# Patient Record
Sex: MALE | Race: WHITE | NOT HISPANIC OR LATINO | URBAN - METROPOLITAN AREA
[De-identification: names, ages, dates, MRNs, and addresses within clinical notes are randomized per-mention and may not be internally consistent; named-entity substitution may affect disease eponyms.]

---

## 2020-07-25 ENCOUNTER — TELEPHONE ENCOUNTER (OUTPATIENT)
Dept: URBAN - METROPOLITAN AREA CLINIC 13 | Facility: CLINIC | Age: 35
End: 2020-07-25

## 2020-07-26 ENCOUNTER — TELEPHONE ENCOUNTER (OUTPATIENT)
Dept: URBAN - METROPOLITAN AREA CLINIC 13 | Facility: CLINIC | Age: 35
End: 2020-07-26

## 2020-09-11 ENCOUNTER — DASHBOARD ENCOUNTERS (OUTPATIENT)
Age: 35
End: 2020-09-11

## 2020-09-11 ENCOUNTER — OFFICE VISIT (OUTPATIENT)
Dept: URBAN - METROPOLITAN AREA CLINIC 113 | Facility: CLINIC | Age: 35
End: 2020-09-11
Payer: SELF-PAY

## 2020-09-11 VITALS
BODY MASS INDEX: 34.07 KG/M2 | HEART RATE: 89 BPM | SYSTOLIC BLOOD PRESSURE: 113 MMHG | WEIGHT: 230 LBS | TEMPERATURE: 97.8 F | HEIGHT: 69 IN | DIASTOLIC BLOOD PRESSURE: 77 MMHG

## 2020-09-11 DIAGNOSIS — K57.30 DIVERTICULOSIS OF COLON: ICD-10-CM

## 2020-09-11 DIAGNOSIS — B18.2 CHRONIC HEPATITIS C: ICD-10-CM

## 2020-09-11 PROBLEM — 733657002 DIVERTICULOSIS OF COLON: Status: ACTIVE | Noted: 2020-09-11

## 2020-09-11 PROBLEM — 128302006 CHRONIC HEPATITIS C: Status: ACTIVE | Noted: 2020-09-11

## 2020-09-11 PROCEDURE — G8427 DOCREV CUR MEDS BY ELIG CLIN: HCPCS | Performed by: NURSE PRACTITIONER

## 2020-09-11 PROCEDURE — 1036F TOBACCO NON-USER: CPT | Performed by: NURSE PRACTITIONER

## 2020-09-11 PROCEDURE — G9903 PT SCRN TBCO ID AS NON USER: HCPCS | Performed by: NURSE PRACTITIONER

## 2020-09-11 PROCEDURE — 99213 OFFICE O/P EST LOW 20 MIN: CPT | Performed by: NURSE PRACTITIONER

## 2020-09-11 RX ORDER — ACETAMINOPHEN 325 MG/1
1 TABLET AS NEEDED TABLET, FILM COATED ORAL
Status: ACTIVE | COMMUNITY

## 2020-09-11 NOTE — HPI-TODAY'S VISIT:
Mr. Almanzar is a 35-year-old male with a history of hemophilia A, HCV, alcohol abuse, diverticulitis (2 years ago), and obesity, presenting for follow-up after hospitalization for abdominal pain. He was admitted to [facility] on 8/27/2020 for complaints of progressively worsening abdominal pain associated with fever, nausea, vomiting, and right shoulder pain.  CT of the abdomen and pelvis demonstrated sigmoid diverticulitis with microperforation.  He was diagnosed with sepsis on presentation secondary to this.  He was treated with IV antibiotics, IV fluids, antiemetics, analgesics, and kept n.p.o.  General surgery was consulted, and recommended conservative management.  He continued to improve with conservative treatment.  He was discharged on Augmentin twice daily for 14 days of antibiotics total, and was recommended to follow-up with surgery outpatient. Abdominal ultrasound 8/27/2020 : Negative study CT of the abdomen and pelvis with IV contrast 8/27/2020 : Free air, consistent with perforated hollow viscus.  Sigmoid diverticulitis.  Faintly calcified liver mass, unchanged. He has two more days of Augmentin to complete a 14 day course. There is no abdominal pain other than mild positional tenderness, fever or chills. No nausea or vomiting. He has bowel movements 1 to 2 times per day; fairly normal form without blood per rectum. He reports that he is expecting to follow up with surgery, and expects to have a colonoscopy with Dr. August in about 6 weeks. He also is to meet with hematology (Dr. Marie) to have his hemophilia worked up.

## 2020-12-11 ENCOUNTER — OFFICE VISIT (OUTPATIENT)
Dept: URBAN - METROPOLITAN AREA CLINIC 113 | Facility: CLINIC | Age: 35
End: 2020-12-11

## 2021-01-12 ENCOUNTER — OFFICE VISIT (OUTPATIENT)
Dept: URBAN - METROPOLITAN AREA CLINIC 113 | Facility: CLINIC | Age: 36
End: 2021-01-12

## 2021-01-12 NOTE — HPI-OTHER HISTORIES
35-year-old male with a history of hemophilia a, HCV, alcohol abuse, diverticulitis (2 years ago), and obesity, presenting for 3 month follow-up.  He was last seen in the office on 9/11/2020 following hospitalization for acute diverticulitis with microperforation, treated conservatively with IV and oral antibiotics.  He was recommended to proceed with colonoscopy in about 4 weeks.  He has for a referral to Dr. King for surgical evaluation.  Additionally, he was planned for HCV genotype and PCR to assess need for hepatitis C treatment.

## 2024-06-20 ENCOUNTER — OFFICE VISIT (OUTPATIENT)
Dept: URBAN - METROPOLITAN AREA CLINIC 113 | Facility: CLINIC | Age: 39
End: 2024-06-20
Payer: COMMERCIAL

## 2024-06-20 VITALS
HEART RATE: 76 BPM | WEIGHT: 254 LBS | SYSTOLIC BLOOD PRESSURE: 128 MMHG | TEMPERATURE: 98.1 F | DIASTOLIC BLOOD PRESSURE: 79 MMHG | RESPIRATION RATE: 18 BRPM | HEIGHT: 69 IN | BODY MASS INDEX: 37.62 KG/M2

## 2024-06-20 DIAGNOSIS — B17.10 HEPATITIS C: ICD-10-CM

## 2024-06-20 DIAGNOSIS — K57.92 DIVERTICULITIS: ICD-10-CM

## 2024-06-20 PROCEDURE — 99204 OFFICE O/P NEW MOD 45 MIN: CPT | Performed by: NURSE PRACTITIONER

## 2024-06-20 PROCEDURE — 99244 OFF/OP CNSLTJ NEW/EST MOD 40: CPT | Performed by: NURSE PRACTITIONER

## 2024-06-20 RX ORDER — ACETAMINOPHEN 325 MG/1
1 TABLET AS NEEDED TABLET, FILM COATED ORAL
Status: ACTIVE | COMMUNITY

## 2024-06-20 NOTE — HPI-OTHER HISTORIES
CT a/p with contrast 4/27/24: acute sigmoid colon diverticulitis with several small foci of extraluminal gas adjacent to the proximal sigmoid colon c/w microperforation. CT a/p with contrast 2/7/24: small bowel obstruction with dilated loops of small bowel measuring up to 3.5cm with transition point in the right lower quadrant. Acute sigmoid diverticulitis. Interval decrease in size in hypoattenuating lesion in the inferior right hepatic lobe measuring 1.8cm which is indeterminate but oculd represent evolving hemangioma. CT of the abdomen and pelvis August 2020 demonstrated sigmoid diverticulitis with microperforation.  Abdominal ultrasound 8/27/2020 : Negative study Previous MRI abdomen w/wo contrast 1/18/18: two T2 hyperintense right hepatic lesions c/w hemangiomas, measuring 29mm and 18mm.

## 2024-06-20 NOTE — HPI-TODAY'S VISIT:
Mr. Almanzar is a 39-year-old male with a history of hemophilia A, HCV, alcohol abuse, recurrent diverticulitis, referred back to our office by Dr. Raúl Marie for evaluation of hepatitis C. A copy of this document is being forwarded to the referring provider.  He was last seen in the office in September 2020 for follow-up after hospitalization for acute diverticulitis with microperforation, treated nonoperatively with IV and oral antibiotics. A colonoscopy was recommended, but not scheduled due to insurance barriers. He was referred to colorectal surgery, Dr. King, at his request. He ultimately underwent exploratory laparotomy and small bowel resection in October 2020 d/t small bowel phlegmon in the setting of microforation of the small bowel. He returns today after a long interval to discuss hepatitis C treatment. Per a review of prior records, we attempted to treat his HCV (with Harvoni or Mavyret) in 2018, but he was unable to obtain the medication due to insurance barriers. He would like to restart the process. He has had issues with recurrent diverticulitis for which he follows closely with Dr. King. A sigmoid colon resection has been recommended, pending hemophilia work-up at [place]. He is followed locally by Dr. Marie.

## 2024-06-24 ENCOUNTER — TELEPHONE ENCOUNTER (OUTPATIENT)
Dept: URBAN - METROPOLITAN AREA CLINIC 113 | Facility: CLINIC | Age: 39
End: 2024-06-24

## 2024-07-09 ENCOUNTER — TELEPHONE ENCOUNTER (OUTPATIENT)
Dept: URBAN - METROPOLITAN AREA CLINIC 113 | Facility: CLINIC | Age: 39
End: 2024-07-09

## 2024-07-09 RX ORDER — LEDIPASVIR AND SOFOSBUVIR 90; 400 MG/1; MG/1
1 TABLET TABLET, FILM COATED ORAL ONCE A DAY
Qty: 84 TABLET | Refills: 0 | OUTPATIENT
Start: 2024-07-09 | End: 2024-10-01

## 2024-07-10 ENCOUNTER — TELEPHONE ENCOUNTER (OUTPATIENT)
Dept: URBAN - METROPOLITAN AREA CLINIC 113 | Facility: CLINIC | Age: 39
End: 2024-07-10

## 2024-07-11 ENCOUNTER — TELEPHONE ENCOUNTER (OUTPATIENT)
Dept: URBAN - METROPOLITAN AREA CLINIC 113 | Facility: CLINIC | Age: 39
End: 2024-07-11

## 2024-07-15 ENCOUNTER — TELEPHONE ENCOUNTER (OUTPATIENT)
Dept: URBAN - METROPOLITAN AREA CLINIC 113 | Facility: CLINIC | Age: 39
End: 2024-07-15

## 2024-07-15 RX ORDER — VELPATASVIR AND SOFOSBUVIR 100; 400 MG/1; MG/1
1 TABLET TABLET, FILM COATED ORAL ONCE A DAY
Qty: 84 TABLET | Refills: 0 | OUTPATIENT
Start: 2024-07-16 | End: 2024-10-08

## 2024-07-19 ENCOUNTER — TELEPHONE ENCOUNTER (OUTPATIENT)
Dept: URBAN - METROPOLITAN AREA CLINIC 113 | Facility: CLINIC | Age: 39
End: 2024-07-19

## 2024-08-05 ENCOUNTER — TELEPHONE ENCOUNTER (OUTPATIENT)
Dept: URBAN - METROPOLITAN AREA CLINIC 113 | Facility: CLINIC | Age: 39
End: 2024-08-05

## 2024-09-19 ENCOUNTER — OFFICE VISIT (OUTPATIENT)
Dept: URBAN - METROPOLITAN AREA CLINIC 113 | Facility: CLINIC | Age: 39
End: 2024-09-19
Payer: COMMERCIAL

## 2024-09-19 VITALS
WEIGHT: 262.6 LBS | BODY MASS INDEX: 38.89 KG/M2 | HEIGHT: 69 IN | HEART RATE: 80 BPM | DIASTOLIC BLOOD PRESSURE: 83 MMHG | SYSTOLIC BLOOD PRESSURE: 119 MMHG | RESPIRATION RATE: 18 BRPM | TEMPERATURE: 99.1 F

## 2024-09-19 DIAGNOSIS — R74.8 ELEVATED LIVER ENZYMES: ICD-10-CM

## 2024-09-19 DIAGNOSIS — B18.2 CARRIER OF VIRAL HEPATITIS C: ICD-10-CM

## 2024-09-19 PROCEDURE — 99214 OFFICE O/P EST MOD 30 MIN: CPT | Performed by: NURSE PRACTITIONER

## 2024-09-19 RX ORDER — ACETAMINOPHEN 325 MG/1
1 TABLET AS NEEDED TABLET, FILM COATED ORAL
Status: ACTIVE | COMMUNITY

## 2024-09-19 RX ORDER — VELPATASVIR AND SOFOSBUVIR 100; 400 MG/1; MG/1
1 TABLET TABLET, FILM COATED ORAL ONCE A DAY
Qty: 84 TABLET | Refills: 0 | Status: ACTIVE | COMMUNITY
Start: 2024-07-16 | End: 2024-10-08

## 2024-09-19 RX ORDER — VELPATASVIR AND SOFOSBUVIR 100; 400 MG/1; MG/1
1 TABLET TABLET, FILM COATED ORAL ONCE A DAY
OUTPATIENT
Start: 2024-07-16

## 2024-09-19 RX ORDER — LEDIPASVIR AND SOFOSBUVIR 90; 400 MG/1; MG/1
1 TABLET TABLET, FILM COATED ORAL ONCE A DAY
Qty: 84 TABLET | Refills: 0 | Status: ON HOLD | COMMUNITY
Start: 2024-07-09 | End: 2024-10-01

## 2024-09-19 NOTE — HPI-OTHER HISTORIES
CT a/p with contrast 4/27/24: acute sigmoid colon diverticulitis with several small foci of extraluminal gas adjacent to the proximal sigmoid colon c/w microperforation. CT a/p with contrast 2/7/24: small bowel obstruction with dilated loops of small bowel measuring up to 3.5cm with transition point in the right lower quadrant. Acute sigmoid diverticulitis. Interval decrease in size in hypoattenuating lesion in the inferior right hepatic lobe measuring 1.8cm which is indeterminate but oculd represent evolving hemangioma. CT of the abdomen and pelvis August 2020 demonstrated sigmoid diverticulitis with microperforation.  S/p exploratory laparotomy and small bowel resection in October 2020 by Dr. King d/t small bowel phlegmon in the setting of microforation of the small bowel. Abdominal ultrasound 8/27/2020 : Negative study Previous MRI abdomen w/wo contrast 1/18/18: two T2 hyperintense right hepatic lesions c/w hemangiomas, measuring 29mm and 18mm.

## 2024-09-19 NOTE — HPI-TODAY'S VISIT:
Mr. Almanzar is a 39-year-old male with a history of hemophilia A, HCV, alcohol abuse, recurrent diverticulitis, presenting for follow-up regarding hepatitis C. He was seen in the office in June to reestablish care regarding treatment naive hepatitis C. Labs, to include HCV genotype and viral RNA, were planned in preparation for treatment. Most recent CT showed a normal liver; no evidence for cirrhosis. He has a history of benign hepatic hemangiomas. Labs 7/17/24 Reactive hepatitis B surface antibody, negative hepatitis B surface antigen.  aFP 2.8. 6/20/2024:H/H 15/44, MCV 98.7, , WBC 6.34.  PT/INR 13.6/1.0.  AST 46, ALT 69, ALP 93, T. bili 0.5, CMP otherwise unremarkable.  Positive hepatitis C antibody with detectable viral load. HCV genotype 1a or 1b. He started Epclusa in early August and has done well on treatment.  He is without abdominal complaint.  No abdominal pain nausea or vomiting.  His bowel habits are regular without blood per rectum.

## 2024-09-20 ENCOUNTER — OFFICE VISIT (OUTPATIENT)
Dept: URBAN - METROPOLITAN AREA CLINIC 113 | Facility: CLINIC | Age: 39
End: 2024-09-20

## 2024-10-17 ENCOUNTER — LAB OUTSIDE AN ENCOUNTER (OUTPATIENT)
Dept: URBAN - METROPOLITAN AREA CLINIC 113 | Facility: CLINIC | Age: 39
End: 2024-10-17

## 2024-12-18 ENCOUNTER — TELEPHONE ENCOUNTER (OUTPATIENT)
Dept: URBAN - METROPOLITAN AREA CLINIC 113 | Facility: CLINIC | Age: 39
End: 2024-12-18

## 2025-01-21 ENCOUNTER — OFFICE VISIT (OUTPATIENT)
Dept: URBAN - METROPOLITAN AREA CLINIC 113 | Facility: CLINIC | Age: 40
End: 2025-01-21
Payer: COMMERCIAL

## 2025-01-21 VITALS
WEIGHT: 282 LBS | BODY MASS INDEX: 41.77 KG/M2 | HEIGHT: 69 IN | DIASTOLIC BLOOD PRESSURE: 81 MMHG | SYSTOLIC BLOOD PRESSURE: 119 MMHG | TEMPERATURE: 98.4 F | HEART RATE: 88 BPM | RESPIRATION RATE: 18 BRPM

## 2025-01-21 DIAGNOSIS — R74.8 ELEVATED LIVER ENZYMES: ICD-10-CM

## 2025-01-21 DIAGNOSIS — B17.10 HEPATITIS C: ICD-10-CM

## 2025-01-21 PROCEDURE — 99214 OFFICE O/P EST MOD 30 MIN: CPT | Performed by: NURSE PRACTITIONER

## 2025-01-21 RX ORDER — VELPATASVIR AND SOFOSBUVIR 100; 400 MG/1; MG/1
1 TABLET TABLET, FILM COATED ORAL ONCE A DAY
Status: ON HOLD | COMMUNITY
Start: 2024-07-16

## 2025-01-21 RX ORDER — ACETAMINOPHEN 325 MG/1
1 TABLET AS NEEDED TABLET, FILM COATED ORAL
Status: ACTIVE | COMMUNITY

## 2025-01-21 NOTE — HPI-OTHER HISTORIES
Labs 7/17/24 Reactive hepatitis B surface antibody, negative hepatitis B surface antigen. aFP 2.8. 6/20/2024:H/H 15/44, MCV 98.7, , WBC 6.34. PT/INR 13.6/1.0. AST 46, ALT 69, ALP 93, T. bili 0.5, CMP otherwise unremarkable. Positive hepatitis C antibody with detectable viral load. HCV genotype 1a or 1b. CT a/p with contrast 4/27/24: acute sigmoid colon diverticulitis with several small foci of extraluminal gas adjacent to the proximal sigmoid colon c/w microperforation. CT a/p with contrast 2/7/24: small bowel obstruction with dilated loops of small bowel measuring up to 3.5cm with transition point in the right lower quadrant. Acute sigmoid diverticulitis. Interval decrease in size in hypoattenuating lesion in the inferior right hepatic lobe measuring 1.8cm which is indeterminate but oculd represent evolving hemangioma. CT of the abdomen and pelvis August 2020 demonstrated sigmoid diverticulitis with microperforation.  S/p exploratory laparotomy and small bowel resection in October 2020 by Dr. King d/t small bowel phlegmon in the setting of microforation of the small bowel. Abdominal ultrasound 8/27/2020 : Negative study Previous MRI abdomen w/wo contrast 1/18/18: two T2 hyperintense right hepatic lesions c/w hemangiomas, measuring 29mm and 18mm.

## 2025-01-21 NOTE — HPI-TODAY'S VISIT:
Mr. Almanzar is a 39-year-old male with a history of hemophilia A, HCV, alcohol abuse, recurrent diverticulitis, presenting for follow-up regarding hepatitis C. He was seen in the office in September for follow-up regarding hepatitis C genotype 1a or 1b, currently completing treatment with Epclusa. Labs were planned to trend his LFTs, and orders were provided for end of treatment bloodwork to assess response to treatment. HCV RNA was planned at follow-up to ensure sustained virologic response. Labs Sept 2024: CBC, CMP, PT/INR unremarkable; normal LFTs. He continues to do well from a GI standpoint. No abdominal pain, nausea or vomiting. His bowel habits are regular without blood per rectum. End of treatment labs have not been completed.